# Patient Record
Sex: FEMALE | Race: WHITE | NOT HISPANIC OR LATINO
[De-identification: names, ages, dates, MRNs, and addresses within clinical notes are randomized per-mention and may not be internally consistent; named-entity substitution may affect disease eponyms.]

---

## 2022-04-26 ENCOUNTER — TRANSCRIPTION ENCOUNTER (OUTPATIENT)
Age: 27
End: 2022-04-26

## 2022-04-26 ENCOUNTER — RESULT REVIEW (OUTPATIENT)
Age: 27
End: 2022-04-26

## 2022-04-26 ENCOUNTER — INPATIENT (INPATIENT)
Facility: HOSPITAL | Age: 27
LOS: 0 days | Discharge: HOME | DRG: 395 | End: 2022-04-26
Attending: STUDENT IN AN ORGANIZED HEALTH CARE EDUCATION/TRAINING PROGRAM | Admitting: STUDENT IN AN ORGANIZED HEALTH CARE EDUCATION/TRAINING PROGRAM
Payer: COMMERCIAL

## 2022-04-26 VITALS
DIASTOLIC BLOOD PRESSURE: 86 MMHG | HEART RATE: 94 BPM | RESPIRATION RATE: 20 BRPM | TEMPERATURE: 98 F | SYSTOLIC BLOOD PRESSURE: 123 MMHG | OXYGEN SATURATION: 98 %

## 2022-04-26 VITALS
RESPIRATION RATE: 16 BRPM | TEMPERATURE: 98 F | DIASTOLIC BLOOD PRESSURE: 60 MMHG | OXYGEN SATURATION: 96 % | SYSTOLIC BLOOD PRESSURE: 100 MMHG | HEART RATE: 97 BPM

## 2022-04-26 DIAGNOSIS — F10.10 ALCOHOL ABUSE, UNCOMPLICATED: ICD-10-CM
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
## 2022-04-26 DIAGNOSIS — K20.0 EOSINOPHILIC ESOPHAGITIS: ICD-10-CM

## 2022-04-26 DIAGNOSIS — D72.829 ELEVATED WHITE BLOOD CELL COUNT, UNSPECIFIED: ICD-10-CM

## 2022-04-26 DIAGNOSIS — R63.8 OTHER SYMPTOMS AND SIGNS CONCERNING FOOD AND FLUID INTAKE: ICD-10-CM

## 2022-04-26 DIAGNOSIS — K22.2 ESOPHAGEAL OBSTRUCTION: ICD-10-CM

## 2022-04-26 DIAGNOSIS — R13.10 DYSPHAGIA, UNSPECIFIED: ICD-10-CM

## 2022-04-26 DIAGNOSIS — K20.90 ESOPHAGITIS, UNSPECIFIED WITHOUT BLEEDING: ICD-10-CM

## 2022-04-26 LAB
ALBUMIN SERPL ELPH-MCNC: 4 G/DL — SIGNIFICANT CHANGE UP (ref 3.4–5)
ALP SERPL-CCNC: 55 U/L — SIGNIFICANT CHANGE UP (ref 40–120)
ALT FLD-CCNC: 19 U/L — SIGNIFICANT CHANGE UP (ref 12–42)
ANION GAP SERPL CALC-SCNC: 8 MMOL/L — LOW (ref 9–16)
APPEARANCE UR: CLEAR — SIGNIFICANT CHANGE UP
APTT BLD: 30 SEC — SIGNIFICANT CHANGE UP (ref 27.5–35.5)
AST SERPL-CCNC: 15 U/L — SIGNIFICANT CHANGE UP (ref 15–37)
BASOPHILS # BLD AUTO: 0.09 K/UL — SIGNIFICANT CHANGE UP (ref 0–0.2)
BASOPHILS NFR BLD AUTO: 0.8 % — SIGNIFICANT CHANGE UP (ref 0–2)
BILIRUB SERPL-MCNC: 0.4 MG/DL — SIGNIFICANT CHANGE UP (ref 0.2–1.2)
BILIRUB UR-MCNC: NEGATIVE — SIGNIFICANT CHANGE UP
BUN SERPL-MCNC: 10 MG/DL — SIGNIFICANT CHANGE UP (ref 7–23)
CALCIUM SERPL-MCNC: 8.8 MG/DL — SIGNIFICANT CHANGE UP (ref 8.5–10.5)
CHLORIDE SERPL-SCNC: 108 MMOL/L — SIGNIFICANT CHANGE UP (ref 96–108)
CO2 SERPL-SCNC: 27 MMOL/L — SIGNIFICANT CHANGE UP (ref 22–31)
COLOR SPEC: YELLOW — SIGNIFICANT CHANGE UP
CREAT SERPL-MCNC: 0.64 MG/DL — SIGNIFICANT CHANGE UP (ref 0.5–1.3)
DIFF PNL FLD: NEGATIVE — SIGNIFICANT CHANGE UP
EGFR: 124 ML/MIN/1.73M2 — SIGNIFICANT CHANGE UP
EOSINOPHIL # BLD AUTO: 0.41 K/UL — SIGNIFICANT CHANGE UP (ref 0–0.5)
EOSINOPHIL NFR BLD AUTO: 3.5 % — SIGNIFICANT CHANGE UP (ref 0–6)
FLUAV AG NPH QL: SIGNIFICANT CHANGE UP
FLUBV AG NPH QL: SIGNIFICANT CHANGE UP
GLUCOSE SERPL-MCNC: 94 MG/DL — SIGNIFICANT CHANGE UP (ref 70–99)
GLUCOSE UR QL: NEGATIVE — SIGNIFICANT CHANGE UP
HCG SERPL-ACNC: <1 MIU/ML — SIGNIFICANT CHANGE UP
HCT VFR BLD CALC: 41.5 % — SIGNIFICANT CHANGE UP (ref 34.5–45)
HGB BLD-MCNC: 13.9 G/DL — SIGNIFICANT CHANGE UP (ref 11.5–15.5)
IMM GRANULOCYTES NFR BLD AUTO: 0.3 % — SIGNIFICANT CHANGE UP (ref 0–1.5)
INR BLD: 0.97 — SIGNIFICANT CHANGE UP (ref 0.88–1.16)
KETONES UR-MCNC: >=80 MG/DL
LEUKOCYTE ESTERASE UR-ACNC: NEGATIVE — SIGNIFICANT CHANGE UP
LIDOCAIN IGE QN: 56 U/L — LOW (ref 73–393)
LYMPHOCYTES # BLD AUTO: 2.76 K/UL — SIGNIFICANT CHANGE UP (ref 1–3.3)
LYMPHOCYTES # BLD AUTO: 23.6 % — SIGNIFICANT CHANGE UP (ref 13–44)
MCHC RBC-ENTMCNC: 31.5 PG — SIGNIFICANT CHANGE UP (ref 27–34)
MCHC RBC-ENTMCNC: 33.5 GM/DL — SIGNIFICANT CHANGE UP (ref 32–36)
MCV RBC AUTO: 94.1 FL — SIGNIFICANT CHANGE UP (ref 80–100)
MONOCYTES # BLD AUTO: 0.67 K/UL — SIGNIFICANT CHANGE UP (ref 0–0.9)
MONOCYTES NFR BLD AUTO: 5.7 % — SIGNIFICANT CHANGE UP (ref 2–14)
NEUTROPHILS # BLD AUTO: 7.75 K/UL — HIGH (ref 1.8–7.4)
NEUTROPHILS NFR BLD AUTO: 66.1 % — SIGNIFICANT CHANGE UP (ref 43–77)
NITRITE UR-MCNC: NEGATIVE — SIGNIFICANT CHANGE UP
NRBC # BLD: 0 /100 WBCS — SIGNIFICANT CHANGE UP (ref 0–0)
PH UR: 6 — SIGNIFICANT CHANGE UP (ref 5–8)
PLATELET # BLD AUTO: 257 K/UL — SIGNIFICANT CHANGE UP (ref 150–400)
POTASSIUM SERPL-MCNC: 4 MMOL/L — SIGNIFICANT CHANGE UP (ref 3.5–5.3)
POTASSIUM SERPL-SCNC: 4 MMOL/L — SIGNIFICANT CHANGE UP (ref 3.5–5.3)
PROT SERPL-MCNC: 7.1 G/DL — SIGNIFICANT CHANGE UP (ref 6.4–8.2)
PROT UR-MCNC: NEGATIVE MG/DL — SIGNIFICANT CHANGE UP
PROTHROM AB SERPL-ACNC: 11.4 SEC — SIGNIFICANT CHANGE UP (ref 10.5–13.4)
RBC # BLD: 4.41 M/UL — SIGNIFICANT CHANGE UP (ref 3.8–5.2)
RBC # FLD: 12 % — SIGNIFICANT CHANGE UP (ref 10.3–14.5)
RSV RNA NPH QL NAA+NON-PROBE: SIGNIFICANT CHANGE UP
SARS-COV-2 RNA SPEC QL NAA+PROBE: SIGNIFICANT CHANGE UP
SODIUM SERPL-SCNC: 143 MMOL/L — SIGNIFICANT CHANGE UP (ref 132–145)
SP GR SPEC: >=1.03 — SIGNIFICANT CHANGE UP (ref 1–1.03)
UROBILINOGEN FLD QL: 0.2 E.U./DL — SIGNIFICANT CHANGE UP
WBC # BLD: 11.71 K/UL — HIGH (ref 3.8–10.5)
WBC # FLD AUTO: 11.71 K/UL — HIGH (ref 3.8–10.5)

## 2022-04-26 PROCEDURE — 99285 EMERGENCY DEPT VISIT HI MDM: CPT

## 2022-04-26 PROCEDURE — 36415 COLL VENOUS BLD VENIPUNCTURE: CPT

## 2022-04-26 PROCEDURE — 80053 COMPREHEN METABOLIC PANEL: CPT

## 2022-04-26 PROCEDURE — 85025 COMPLETE CBC W/AUTO DIFF WBC: CPT

## 2022-04-26 PROCEDURE — 85610 PROTHROMBIN TIME: CPT

## 2022-04-26 PROCEDURE — 81003 URINALYSIS AUTO W/O SCOPE: CPT

## 2022-04-26 PROCEDURE — 71045 X-RAY EXAM CHEST 1 VIEW: CPT | Mod: 26

## 2022-04-26 PROCEDURE — 87637 SARSCOV2&INF A&B&RSV AMP PRB: CPT

## 2022-04-26 PROCEDURE — 96372 THER/PROPH/DIAG INJ SC/IM: CPT | Mod: XU

## 2022-04-26 PROCEDURE — 43247 EGD REMOVE FOREIGN BODY: CPT | Mod: GC

## 2022-04-26 PROCEDURE — 83690 ASSAY OF LIPASE: CPT

## 2022-04-26 PROCEDURE — 99222 1ST HOSP IP/OBS MODERATE 55: CPT | Mod: 25

## 2022-04-26 PROCEDURE — 99223 1ST HOSP IP/OBS HIGH 75: CPT | Mod: GC

## 2022-04-26 PROCEDURE — 87086 URINE CULTURE/COLONY COUNT: CPT

## 2022-04-26 PROCEDURE — 88305 TISSUE EXAM BY PATHOLOGIST: CPT | Mod: 26

## 2022-04-26 PROCEDURE — 84702 CHORIONIC GONADOTROPIN TEST: CPT

## 2022-04-26 PROCEDURE — 96374 THER/PROPH/DIAG INJ IV PUSH: CPT

## 2022-04-26 PROCEDURE — 88305 TISSUE EXAM BY PATHOLOGIST: CPT

## 2022-04-26 PROCEDURE — 96375 TX/PRO/DX INJ NEW DRUG ADDON: CPT

## 2022-04-26 PROCEDURE — 85730 THROMBOPLASTIN TIME PARTIAL: CPT

## 2022-04-26 PROCEDURE — 99285 EMERGENCY DEPT VISIT HI MDM: CPT | Mod: 25

## 2022-04-26 PROCEDURE — 71045 X-RAY EXAM CHEST 1 VIEW: CPT

## 2022-04-26 RX ORDER — SODIUM CHLORIDE 9 MG/ML
1000 INJECTION INTRAMUSCULAR; INTRAVENOUS; SUBCUTANEOUS ONCE
Refills: 0 | Status: COMPLETED | OUTPATIENT
Start: 2022-04-26 | End: 2022-04-26

## 2022-04-26 RX ORDER — OMEPRAZOLE 10 MG/1
1 CAPSULE, DELAYED RELEASE ORAL
Qty: 30 | Refills: 0
Start: 2022-04-26 | End: 2022-05-25

## 2022-04-26 RX ORDER — DILTIAZEM HCL 120 MG
10 CAPSULE, EXT RELEASE 24 HR ORAL ONCE
Refills: 0 | Status: COMPLETED | OUTPATIENT
Start: 2022-04-26 | End: 2022-04-26

## 2022-04-26 RX ORDER — ONDANSETRON 8 MG/1
4 TABLET, FILM COATED ORAL ONCE
Refills: 0 | Status: COMPLETED | OUTPATIENT
Start: 2022-04-26 | End: 2022-04-26

## 2022-04-26 RX ORDER — SODIUM CHLORIDE 9 MG/ML
500 INJECTION INTRAMUSCULAR; INTRAVENOUS; SUBCUTANEOUS ONCE
Refills: 0 | Status: COMPLETED | OUTPATIENT
Start: 2022-04-26 | End: 2022-04-26

## 2022-04-26 RX ORDER — FAMOTIDINE 10 MG/ML
20 INJECTION INTRAVENOUS ONCE
Refills: 0 | Status: COMPLETED | OUTPATIENT
Start: 2022-04-26 | End: 2022-04-26

## 2022-04-26 RX ORDER — LANOLIN ALCOHOL/MO/W.PET/CERES
3 CREAM (GRAM) TOPICAL AT BEDTIME
Refills: 0 | Status: DISCONTINUED | OUTPATIENT
Start: 2022-04-26 | End: 2022-04-26

## 2022-04-26 RX ORDER — PANTOPRAZOLE SODIUM 20 MG/1
40 TABLET, DELAYED RELEASE ORAL ONCE
Refills: 0 | Status: COMPLETED | OUTPATIENT
Start: 2022-04-26 | End: 2022-04-26

## 2022-04-26 RX ORDER — GLUCAGON INJECTION, SOLUTION 0.5 MG/.1ML
2 INJECTION, SOLUTION SUBCUTANEOUS ONCE
Refills: 0 | Status: COMPLETED | OUTPATIENT
Start: 2022-04-26 | End: 2022-04-26

## 2022-04-26 RX ORDER — KETOROLAC TROMETHAMINE 30 MG/ML
15 SYRINGE (ML) INJECTION ONCE
Refills: 0 | Status: DISCONTINUED | OUTPATIENT
Start: 2022-04-26 | End: 2022-04-26

## 2022-04-26 RX ORDER — ACETAMINOPHEN 500 MG
650 TABLET ORAL EVERY 6 HOURS
Refills: 0 | Status: DISCONTINUED | OUTPATIENT
Start: 2022-04-26 | End: 2022-04-26

## 2022-04-26 RX ORDER — FEXOFENADINE HCL 30 MG
0 TABLET ORAL
Qty: 0 | Refills: 0 | DISCHARGE

## 2022-04-26 RX ADMIN — FAMOTIDINE 20 MILLIGRAM(S): 10 INJECTION INTRAVENOUS at 03:43

## 2022-04-26 RX ADMIN — Medication 10 MILLIGRAM(S): at 04:51

## 2022-04-26 RX ADMIN — SODIUM CHLORIDE 1000 MILLILITER(S): 9 INJECTION INTRAMUSCULAR; INTRAVENOUS; SUBCUTANEOUS at 04:30

## 2022-04-26 RX ADMIN — Medication 0.5 MILLIGRAM(S): at 03:43

## 2022-04-26 RX ADMIN — ONDANSETRON 4 MILLIGRAM(S): 8 TABLET, FILM COATED ORAL at 04:30

## 2022-04-26 RX ADMIN — GLUCAGON INJECTION, SOLUTION 2 MILLIGRAM(S): 0.5 INJECTION, SOLUTION SUBCUTANEOUS at 02:34

## 2022-04-26 RX ADMIN — SODIUM CHLORIDE 500 MILLILITER(S): 9 INJECTION INTRAMUSCULAR; INTRAVENOUS; SUBCUTANEOUS at 14:35

## 2022-04-26 RX ADMIN — Medication 15 MILLIGRAM(S): at 09:36

## 2022-04-26 RX ADMIN — PANTOPRAZOLE SODIUM 40 MILLIGRAM(S): 20 TABLET, DELAYED RELEASE ORAL at 03:44

## 2022-04-26 NOTE — ED PROVIDER NOTE - NSFOLLOWUPINSTRUCTIONS_ED_ALL_ED_FT
- Continue all regular medications  - For pain, take tylenol or ibuprofen as directed on the packaging  - Follow up with your gastroenterologist as scheduled  - Return to the ER for any worsening symptoms or concerns

## 2022-04-26 NOTE — DISCHARGE NOTE PROVIDER - NSDCMRMEDTOKEN_GEN_ALL_CORE_FT
Allegra 30 mg oral tablet:   omeprazole 40 mg oral delayed release capsule: 1 cap(s) orally once a day

## 2022-04-26 NOTE — H&P ADULT - HISTORY OF PRESENT ILLNESS
Pt is a 26 yo F with no PMH who p/w globus sensation after swallowing pills the evening prior to admission. Was taking allegra and MV when she felt that they got stuck in her throat, and since hasn't tolerated PO. Has had a similar episode 1x in the past but did not last as long. Pending outpt GI eval but postponed appt. Due for EGD but has never had one yet. Denies N/V, abd pain, changes to appetite prior to episode.     On arrival, T 98.3, HR 94, /86, RR 20 O2sat 98% RA. EKG NSR. ER provider performed glidescope exam with base of vocal cords visualized without foreign body. Labs with WBC 11.71, CMP unremarkable, lipase neg, UA neg, RVP neg. CXR neg. Pt received toradol 15mg, diltiazem 10mg IV, famotidine 20mg IV, glucagon 2mg IM, ativan 0.5mg IV, zofran 4mg x2, protonix 40mg IV, and NS 1L. Admitted to [facility] for further observation and management. GI consulted and planning for possible EGD.  Pt is a 26 yo F with no PMH who p/w globus sensation after swallowing pills the evening prior to admission. Was taking allegra and MV when she felt that they got stuck in her throat, and since hasn't tolerated PO. Has had a similar episode in the past but these episodes were mostly related to solid food and resolved after a few minutes. Pending outpt GI eval but postponed appt. Due for EGD but has never had one yet. Pt continuing to complain of stuck feeling in throat and is actively spitting up secretions. Denies N/V, abd pain, changes to appetite prior to episode, no recent illnesses, fevers, chills, lightheadedness or dizziness. Home medications only include MV and allegra.     On arrival, T 98.3, HR 94, /86, RR 20 O2sat 98% RA. EKG NSR. ER provider performed glidescope exam with base of vocal cords visualized without foreign body. Labs with WBC 11.71, CMP unremarkable, lipase neg, UA neg, RVP neg. CXR neg. Pt received toradol 15mg, diltiazem 10mg IV, famotidine 20mg IV, glucagon 2mg IM, ativan 0.5mg IV, zofran 4mg x2, protonix 40mg IV, and NS 1L. Admitted to [facility] for further observation and management. GI consulted and planning for possible EGD.

## 2022-04-26 NOTE — ED PROVIDER NOTE - PHYSICAL EXAMINATION
Physical Exam:  Gen: NAD, AOx3, non-toxic appearing, able to ambulate without assistance  Head: NCAT  HEENT: EOMI, PEERLA, normal conjunctiva, tongue midline, oral mucosa moist, no foreign body in oropharynx, tolerating secretions  Lung: no respiratory distress, speaking in full sentences  Abd: soft, NT, ND  MSK: no visible deformities, ROM normal in UE/LE  Skin: Warm, well perfused, no rash  Psych: normal affect, calm Physical Exam:  Gen: NAD, AOx3, non-toxic appearing, able to ambulate without assistance  Head: NCAT  HEENT: EOMI, PEERLA, normal conjunctiva, tongue midline, oral mucosa moist, no foreign body in oropharynx, occasional spitting into bag  Lung: no respiratory distress, speaking in full sentences  Abd: soft, NT, ND  MSK: no visible deformities, ROM normal in UE/LE  Skin: Warm, well perfused, no rash  Psych: normal affect, calm

## 2022-04-26 NOTE — H&P ADULT - PROBLEM SELECTOR PLAN 4
F: S/p 1L NaCl in ED   E: Replete PRN   N: NPO   Dispo: RMF Pt endorsing 3-4 episodes of 3-4 drinks per week. In precontemplative state.   -Outpatient follow up alcohol counseling

## 2022-04-26 NOTE — H&P ADULT - NSHPPHYSICALEXAM_GEN_ALL_CORE
PHYSICAL EXAM:  GENERAL: NAD, resting comfortably in bed. Actively spitting up secretions.   HEAD:  Atraumatic, Normocephalic  EYES: EOMI, PERRLA, conjunctiva and sclera clear  ENMT: No tonsillar erythema, exudates, or enlargement; Moist mucous membranes  NECK: Supple, No JVD, Normal thyroid  HEART: Regular rate and rhythm; No murmurs, rubs, or gallops  RESPIRATORY: CTA B/L, No W/R/R, no respiratory distress and speaking in full sentences.   ABDOMEN: Soft, Nontender, Nondistended; Bowel sounds present  NEUROLOGY: A&Ox3, nonfocal, moving all extremities  EXTREMITIES:  2+ Peripheral Pulses, No clubbing, cyanosis, or edema  SKIN: warm, dry, normal color, no rash or abnormal lesions PHYSICAL EXAM:  GENERAL: NAD, resting comfortably in bed. Actively spitting up secretions.   HEAD:  Atraumatic, Normocephalic  EYES: EOMI, PERRLA, conjunctiva and sclera clear  ENMT: No tonsillar erythema, exudates, or enlargement; Moist mucous membranes. No lesions or thrush noted.  NECK: Supple, No JVD, Normal thyroid  HEART: Regular rate and rhythm; No murmurs, rubs, or gallops  RESPIRATORY: CTA B/L, No W/R/R, no respiratory distress and speaking in full sentences.   ABDOMEN: Soft, Nontender, Nondistended; Bowel sounds present  NEUROLOGY: A&Ox3, nonfocal, moving all extremities  EXTREMITIES:  2+ Peripheral Pulses, No clubbing, cyanosis, or edema  SKIN: warm, dry, normal color, no rash or abnormal lesions

## 2022-04-26 NOTE — H&P ADULT - ASSESSMENT
Pt is a 26 yo F with no PMH who p/w globus sensation after swallowing pills the evening prior to admission. GI consulted and planning for possible EGD. Admitted to [facility] for further observation and management.

## 2022-04-26 NOTE — H&P ADULT - PROBLEM SELECTOR PLAN 1
- pt p/w 1d globus sensation after Pt p/w 1d globus sensation following taking multiple pills last night. Pt has had these episodes in the past that only last for about 2-3 minutes, however this episode has not yet resolved. Pt received Diltiazem, Glucagon, famotidine, ativan, zofran in ED without relief. Pending outpatient GI evaluation. CXR without acute pathology or foreign body, glidescope in ED without foreign body.   -GI consult, plan for EGD this afternoon   -NPO pending EGD Pill impaction esophagitis following pill ingestion. Pt failed medical management with Diltiazem, Glucagon, famotidine, ativan, zofran in ED . Continuing to c/o "stuck" sensation in throat with active spitting up of secretions    -GI consult, plan for EGD this afternoon   -NPO pending EGD

## 2022-04-26 NOTE — CONSULT NOTE ADULT - ATTENDING COMMENTS
Pt seen and d/w fellow this afternoon.  Pt had an EGD demonstrating impacted pill debris that was dislodged and fell into the stomach.

## 2022-04-26 NOTE — ED PROVIDER NOTE - NS ED ATTENDING STATEMENT MOD
I have seen and examined this patient and fully participated in the care of this patient as the teaching attending.  The service was shared with the EDUARDO.  I reviewed and verified the documentation and independently performed the documented:

## 2022-04-26 NOTE — DISCHARGE NOTE PROVIDER - CARE PROVIDERS DIRECT ADDRESSES
[email or web address]

## 2022-04-26 NOTE — DISCHARGE NOTE PROVIDER - HOSPITAL COURSE
#Discharge: do not delete    28 yo F with no significant pmh other presenting for pill impaction with inability to tolerate secretions s/p successful pill disimpaction via EGD.    Problem List/Main Diagnoses (system-based):     #Pill Impaction- s/p EGD   EGD findings: Pill debris was seen in the esophagus, with obstruction of the distal esophagus. The debris was flushed and easily pushed in to the stomach with corbin pressure, resulting in successful disimpaction. Afterwards, examination of the underlying mucosa showed distal esophagitis caused by pill impaction. The proximal esophagus had linear furrows, suggestive of EoE. The GE junction was located at 40cm from the incisors. Biopsies were obtained at the distal esophagus and at 30cm from the incisors.  - Normal stomach; biopsies were obtained to evaluate for H.Pylori infection.   - Normal duodenum; biopsies were obtained to evaluate for celiac disease.   - D/c on omperazole 40mg qd  - F/u w/Dr. Lance out pt      New medications: omeprazole 40mg qd  Labs to be followed outpatient: H pylori bx  Exam to be followed outpatient: none   #Discharge: do not delete    28 yo F with no significant pmh other presenting for pill impaction with inability to tolerate secretions s/p successful pill disimpaction via EGD.    Problem List/Main Diagnoses (system-based):     #Pill Impaction- s/p EGD   EGD findings: Pill debris was seen in the esophagus, with obstruction of the distal esophagus. The debris was flushed and easily pushed in to the stomach with corbin pressure, resulting in successful disimpaction. Afterwards, examination of the underlying mucosa showed distal esophagitis caused by pill impaction. The proximal esophagus had linear furrows, suggestive of EoE. The GE junction was located at 40cm from the incisors. Biopsies were obtained at the distal esophagus and at 30cm from the incisors.  - Normal stomach; biopsies were obtained to evaluate for H.Pylori infection.   - Normal duodenum; biopsies were obtained to evaluate for celiac disease.   - D/c on omperazole 40mg qd  - F/u w/Dr. Lance out pt  - on discharge, patient tolerating PO, mils nausea after anesthesia      New medications: omeprazole 40mg qd  Labs to be followed outpatient: H pylori bx  Exam to be followed outpatient: none    Physical Exam at Time of Discharge  GENERAL: Awake, alert and interactive, no acute distress, appears comfortable  NEURO: A&Ox4, no focal deficits  HEENT: MMM  NECK: Supple  CARDIAC: Regular rate and rhythm, +S1/S2, no murmurs/rubs/gallops  PULM: Breathing comfortably on RA, clear to auscultation bilaterally, no wheezes/rales/rhonchi  ABDOMEN: Soft, nontender, nondistended  MSK: Range of motion grossly intact  Psych: Appropriate affect #Discharge: do not delete    28 yo F with no significant pmh other presenting for pill impaction with inability to tolerate secretions s/p successful pill disimpaction via EGD.    Problem List/Main Diagnoses (system-based):     #Pill Impaction- s/p EGD   EGD findings: Pill debris was seen in the esophagus, with obstruction of the distal esophagus. The debris was flushed and easily pushed in to the stomach with corbin pressure, resulting in successful disimpaction. Afterwards, examination of the underlying mucosa showed distal esophagitis caused by pill impaction. The proximal esophagus had linear furrows, suggestive of EoE. The GE junction was located at 40cm from the incisors. Biopsies were obtained at the distal esophagus and at 30cm from the incisors.  - Normal stomach; biopsies were obtained to evaluate for H.Pylori infection.   - Normal duodenum; biopsies were obtained to evaluate for celiac disease.   - D/c on omperazole 40mg qd  - F/u w/Dr. Lance out pt  - on discharge, patient tolerating PO, mild nausea after anesthesia      New medications: omeprazole 40mg qd  Labs to be followed outpatient: H pylori bx  Exam to be followed outpatient: none    Physical Exam at Time of Discharge  GENERAL: Awake, alert and interactive, no acute distress, appears comfortable  NEURO: A&Ox4, no focal deficits  HEENT: MMM  NECK: Supple  CARDIAC: Regular rate and rhythm, +S1/S2, no murmurs/rubs/gallops  PULM: Breathing comfortably on RA, clear to auscultation bilaterally, no wheezes/rales/rhonchi  ABDOMEN: Soft, nontender, nondistended  MSK: Range of motion grossly intact  Psych: Appropriate affect

## 2022-04-26 NOTE — DISCHARGE NOTE PROVIDER - NSDCCPCAREPLAN_GEN_ALL_CORE_FT
PRINCIPAL DISCHARGE DIAGNOSIS  Diagnosis: Food impaction of esophagus  Assessment and Plan of Treatment: You had a pill impaction that was successfully advanced using an endoscope, there were no complications to the procedure. Please take omeprazole 40mg once a day. Please followup with your gastroenterologist within 2-4 weeks.

## 2022-04-26 NOTE — ED ADULT NURSE NOTE - OBJECTIVE STATEMENT
Pt came to er c/o foreign body stuck in throat after taking vitamins. Pt unable to swallow liquids, coughing noted. Pt endorses hx of 1 pervious similar episode. Airway patent. Pt able to speak in full/clear sentences w/out difficulty. No drooling noted.

## 2022-04-26 NOTE — PATIENT PROFILE ADULT - FALL HARM RISK - UNIVERSAL INTERVENTIONS
Bed in lowest position, wheels locked, appropriate side rails in place/Call bell, personal items and telephone in reach/Instruct patient to call for assistance before getting out of bed or chair/Non-slip footwear when patient is out of bed/[place] to call system/Physically safe environment - no spills, clutter or unnecessary equipment/Purposeful Proactive Rounding/Room/bathroom lighting operational, light cord in reach

## 2022-04-26 NOTE — H&P ADULT - NSHPSOCIALHISTORY_GEN_ALL_CORE
Pt lives by herself in apartment. Fully functional in all ADLs at baseline. Denies smoking history. Drinks 3-4 drinks, 3-4 times per week. Pt lives by herself in apartment. Fully functional in all ADLs at baseline. Denies smoking history. Drinks 3-4 drinks, 3-4 times per week. Sexually active with one partner, HIV test within the past year negative.

## 2022-04-26 NOTE — ED PROVIDER NOTE - PROGRESS NOTE DETAILS
Alisa PGY3: patient continues to have symptoms, when tried to drink water "feels like a ping pong ball in the back of my throat". Will trial IV meds. Alisa PGY3: patient continues to have symptoms, when tried to drink water "feels like a ping pong ball in the back of my throat", not able to swallow. Will trial IV meds. Alisa PGY3: no improvement with meds, will get labs, still unable to swallow, spits back up when attempts. Alisa PGY3: unchanged, GI to scope at [facility], CXR clear

## 2022-04-26 NOTE — DISCHARGE NOTE NURSING/CASE MANAGEMENT/SOCIAL WORK - PATIENT PORTAL LINK FT
You can access the FollowMyHealth Patient Portal offered by [facility] by registering at the following website: [email or web address]. By joining [facility]’s FollowMyHealth portal, you will also be able to view your health information using other applications (apps) compatible with our system.

## 2022-04-26 NOTE — H&P ADULT - ATTENDING COMMENTS
27F with intermittent dysphagia to solids who presents after pill impaction in esophagus. Conservative therapy failed in ER and admitted for EGD for eval for possible pill impaction. Patient unable to tolerate secretions at this time spitting them out 2/2 pain.     Labs and imaging reviewed    Problem List:  #Pill impaction  #Dysphagia  #Leukocytosis  #EtOH Binge    Plan:  -GI on board appreciate recs, f/u regarding EGD  -Rest of plan pending results of scope  -Leukocytosis reactive  -Precontemplative for drinking    Dispo: Home when cleared

## 2022-04-26 NOTE — H&P ADULT - PROBLEM SELECTOR PLAN 3
On admission, WBC 11.71. Pt denying any infectious sx at this time. CXR without acute pathology, glidescope in ED without foreign body.  Likely reactive leukocytosis.   -Continue to trend WBC  -Monitor for signs and symptoms of infection

## 2022-04-26 NOTE — ED PROVIDER NOTE - CLINICAL SUMMARY MEDICAL DECISION MAKING FREE TEXT BOX
28yo female p/w sensation of pills being stuck in throat since 2 hours prior to arrival. No respiratory distress, tolerating secretions. Likely globus sensation from pills. Do not suspect pill esophagitis from multivitamin or allegra. Unlikely to cause esophageal impaction given size of pills and likely would have partially dissolved by oral enzymes. Offered patient PO, state "I'm not going to be able to eat or drink anything". Will give glucagon and zofran and PO challenge, likely dc. 28yo female p/w sensation of pills being stuck in throat since 2 hours prior to arrival. No respiratory distress, occasionally spitting into bag. Likely globus sensation from pills vs esophageal spasm. Do not suspect pill esophagitis from multivitamin or allegra. Unlikely to cause esophageal impaction given size of pills and likely would have partially dissolved by oral enzymes. Offered patient PO, state "I'm not going to be able to eat or drink anything". Will give glucagon and zofran and PO challenge, reassess.

## 2022-04-26 NOTE — ED ADULT NURSE REASSESSMENT NOTE - NS ED NURSE REASSESS COMMENT FT1
Pt resting requesting pain management. PA and MD aware. Pt  pending EMS transport to [facility]. Will continue to monitor.

## 2022-04-26 NOTE — CHART NOTE - NSCHARTNOTEFT_GEN_A_CORE
Pt is s/p EGD for pill impaction with myself and Dr. Lance    Findings:  - Pill debris was seen in the esophagus, with obstruction of the distal esophagus. The debris was flushed and easily pushed in to the stomach with corbin pressure, resulting in successful dissimpaction. Afterwards, examination of the underlying mucosa showed distal esophagitis caused by pill impaction. The proximal esophagus had linear furrows, suggestive of EoE. The GE junction was located at 40cm from the incisors. Biopsies were obtained at the distal esophagus and at 30cm from the incisors.  - Normal stomach; biopsies were obtained to evaluate for H.Pylori infection.   - Normal duodenum; biopsies were obtained to evaluate for celiac disease.     Plan:  - Advance diet as tolerated  - Daily PPI  - Avoid NSAIDS  - Follow up biopsy  - Follow up outpatient in GI clinic

## 2022-04-26 NOTE — DISCHARGE NOTE PROVIDER - CARE PROVIDER_API CALL
Progress/goals assessment (every 10 visits) by  PT              HEP:          Objective   findings: Right knee AA/flex = 110 degrees Right knee AA/flex = 110 degrees Right knee AA/flex = 110 degrees, ambulating w cane Right knee AA/flex = 110 degrees, ambulating w cane Right knee AA/flex = 110 degrees, ambulating w cane  Was able to complete more of her exercises today without increasing pain.    Manual treatments/provider interaction:  Verbal and manual cueing on proper performance of the prescribed exercise or of the designated task Verbal and manual cueing on proper performance of the prescribed exercise or of the designated task Verbal and manual cueing on proper performance of the prescribed exercise or of the designated task Verbal and manual cueing on proper performance of the prescribed exercise or of the designated task Verbal and manual cueing on proper performance of the prescribed exercise or of the designated task Verbal and manual cueing on proper performance of the prescribed exercise or of the designated task Verbal and manual cueing on proper performance of the prescribed exercise or of the designated task   Response to intervention:   Right knee remained 8/10 Muscle fatigue, right knee 8/10 Muscle fatigue, B knee 8/10 Muscle fatigue, B knee 8/10 Muscle fatigue Muscle fatigue Reports of muscle fatigue   Plan for Next Session: continue Cont 1x/week Cont 1x/week Cont 1x/week Cont 1x/week Cont 1x/week Cont 1x/week     Modality/intervention used:  [x] Therapeutic Exercise  [] Modalities:  [] Therapeutic Activity     [] Ultrasound  [] Elec  Stim  [] Gait Training      [] Cervical Traction [] Lumbar Traction  [] Neuromuscular Re-education    [] Sim Gang [] Iontophoresis   [] Instruction in HEP      [] Vasopneumatic     [] Manual Therapy               [x] Self care home management                    (    ) Dry needling    Post Tx Pain Ratin/10 B knee Wilfrid Lance J  GASTROENTEROLOGY  [address] [address]  Phone: [phone]  Fax: [phone]  Established Patient  Follow Up Time: 2 weeks

## 2022-04-26 NOTE — ED PROVIDER NOTE - OBJECTIVE STATEMENT
28yo female p/w difficulty swallowing since 12am after taking two Allegra and two multivitamin pills. Feels like pills are stuck, has had this before but never lasted this long. Has not been able to take PO since this happened. Denies abd pain, N/V. Had upcoming GI appt but postponed it. 26yo female p/w difficulty swallowing since 12am after taking two Allegra and two multivitamin pills. Feels like pills are stuck, has had this before but never lasted this long. Has not been able to take PO since this happened. Denies abd pain, N/V. Had upcoming GI appt but postponed it. was going to get an endoscopy for these symptoms but hadn't had them in awhile. Never had endoscopy before.

## 2022-04-26 NOTE — ED PROVIDER NOTE - ATTENDING CONTRIBUTION TO CARE
patient s/p swallowing 4 separate pills earlier in the evening, presents with pooling saliva, unable to bring down fluids, vomiting, and spitting up this evening. notes hx of similar, which resolve on their own, no hx of gi intervention for lodged bolus. patient given glucagon, ccb, benzo and antiemetic with some relief. cetacaine in the posterior oropharynx placed, and glidescope advance, visualized base of cords, no fb identified, cxr wnl with no fb. case discussed with gi and medicine. patient stable for admission and transfer and amenable to admission to [facility] for gi evaluation.

## 2022-04-26 NOTE — ED ADULT TRIAGE NOTE - CHIEF COMPLAINT QUOTE
Pt c/o foreign body stuck in throat after taking vitamins. Pt endorses hx of 1 pervious similar episode. Airway patent. Pt able to speak in full/clear sentences w/out difficulty. No drooling noted.

## 2022-04-26 NOTE — CONSULT NOTE ADULT - SUBJECTIVE AND OBJECTIVE BOX
GASTROENTEROLOGY CONSULT NOTE  HPI:  Pt is a 28 yo F with no PMH who p/w globus sensation after swallowing pills the evening prior to admission. Was taking allegra and MV when she felt that they got stuck in her throat, and since hasn't tolerated PO. Prior to taking pills she had pretzels for dinner and no issue with swallowing/ drinking. Has had a similar episodes in the past with sticking sensation but previously episodes self resolved minutes later. Never required ED visit or emergent scope before. Last episode was ~3 months ago. She saw a GI at [facility] who though she may have EoE and planned for EGD, but pt has not yet followed up. She has never had an EGD. Denies N/V, reflux, abd pain, changes to appetite prior to episode.     Tob: denies  ETOH: social, 3x/ week  NSAIDS: denies  FHx: Father with HTN and GERD    On arrival, T 98.3, HR 94, /86, RR 20 O2sat 98% RA. EKG NSR. ER provider performed glidescope exam with base of vocal cords visualized without foreign body. Labs with WBC 11.71, CMP unremarkable, lipase neg, UA neg, RVP neg. CXR neg. Pt received toradol 15mg, diltiazem 10mg IV, famotidine 20mg IV, glucagon 2mg IM, ativan 0.5mg IV, zofran 4mg x2, protonix 40mg IV, and NS 1L. Admitted to [facility] for further observation and management. GI consulted for EGD.      Allergies    amoxicillin (Unknown)    Intolerances      Home Medications:    MEDICATIONS:  MEDICATIONS  (STANDING):  LORazepam   Injectable 0.5 milliGRAM(s) IV Push once  melatonin 3 milliGRAM(s) Oral at bedtime    MEDICATIONS  (PRN):  acetaminophen     Tablet .. 650 milliGRAM(s) Oral every 6 hours PRN Temp greater or equal to 38C (100.4F), Mild Pain (1 - 3)    PAST MEDICAL & SURGICAL HISTORY:  No pertinent past medical history    No significant past surgical history      FAMILY HISTORY:    SOCIAL HISTORY:  Tobacco: [ ] Current, [ ] Former, [ ] Never; Pack Years:  Alcohol:  Illicit Drugs:    REVIEW OF SYSTEMS:  CONSTITUTIONAL: No weakness, fevers or chills  HEENT: No visual changes; No vertigo or throat pain   NECK: No pain or stiffness  RESPIRATORY: No cough, wheezing, hemoptysis; No shortness of breath  CARDIOVASCULAR: No chest pain or palpitations  GASTROINTESTINAL: As above.  GENITOURINARY: No dysuria, frequency or hematuria  NEUROLOGICAL: No numbness or weakness  SKIN: No itching, burning, rashes, or lesions   All other 10 review of systems is negative unless indicated above.    Vital Signs Last 24 Hrs  T(C): 36.7 (26 Apr 2022 13:11), Max: 36.9 (26 Apr 2022 07:18)  T(F): 98.1 (26 Apr 2022 13:11), Max: 98.5 (26 Apr 2022 07:18)  HR: 94 (26 Apr 2022 13:11) (77 - 101)  BP: 123/83 (26 Apr 2022 13:11) (110/72 - 148/98)  BP(mean): 96 (26 Apr 2022 13:11) (96 - 96)  RR: 18 (26 Apr 2022 13:11) (16 - 20)  SpO2: 96% (26 Apr 2022 13:11) (96% - 99%)      PHYSICAL EXAM:    General: in no acute distress  Eyes: Anicteric sclerae, moist conjunctivae  HENT: Moist mucous membranes  Neck: Trachea midline, supple  Lungs: Normal respiratory effort, no intercostal retractions  Cardiovascular: RRR  Abdomen: Soft, non-tender non-distended; No rebound or guarding  Extremities: Normal range of motion, No clubbing, cyanosis or edema  Neurological: Alert and oriented x3  Skin: Warm and dry. No obvious rash    LABS:                        13.9   11.71 )-----------( 257      ( 26 Apr 2022 04:45 )             41.5     04-26    143  |  108  |  10  ----------------------------<  94  4.0   |  27  |  0.64    Ca    8.8      26 Apr 2022 04:45    TPro  7.1  /  Alb  4.0  /  TBili  0.4  /  DBili  x   /  AST  15  /  ALT  19  /  AlkPhos  55  04-26        PT/INR - ( 26 Apr 2022 04:45 )   PT: 11.4 sec;   INR: 0.97          PTT - ( 26 Apr 2022 04:45 )  PTT:30.0 sec    RADIOLOGY & ADDITIONAL STUDIES:     Reviewed

## 2022-04-26 NOTE — DISCHARGE NOTE NURSING/CASE MANAGEMENT/SOCIAL WORK - NSDCPEFALRISK_GEN_ALL_CORE
For information on Fall & Injury Prevention, visit: [email or web address] OR  [email or web address] OR  https://www.cdc.gov/steadi/patient.html

## 2022-04-26 NOTE — H&P ADULT - NSHPLABSRESULTS_GEN_ALL_CORE
LABS:                        13.9   11.71 )-----------( 257      ( 26 Apr 2022 04:45 )             41.5     04-26    143  |  108  |  10  ----------------------------<  94  4.0   |  27  |  0.64    Ca    8.8      26 Apr 2022 04:45    TPro  7.1  /  Alb  4.0  /  TBili  0.4  /  DBili  x   /  AST  15  /  ALT  19  /  AlkPhos  55  04-26    PT/INR - ( 26 Apr 2022 04:45 )   PT: 11.4 sec;   INR: 0.97          PTT - ( 26 Apr 2022 04:45 )  PTT:30.0 sec  Urinalysis Basic - ( 26 Apr 2022 11:51 )    Color: Yellow / Appearance: Clear / SG: >=1.030 / pH: x  Gluc: x / Ketone: >=80 mg/dL  / Bili: NEGATIVE / Urobili: 0.2 E.U./dL   Blood: x / Protein: NEGATIVE mg/dL / Nitrite: NEGATIVE   Leuk Esterase: NEGATIVE / RBC: x / WBC x   Sq Epi: x / Non Sq Epi: x / Bacteria: x      CAPILLARY BLOOD GLUCOSE          RADIOLOGY & ADDITIONAL TESTS: Reviewed.

## 2022-04-26 NOTE — H&P ADULT - PROBLEM SELECTOR PLAN 2
Pt with dysphagia following swallowing of pills late last night. Now c/o "stuck" sensation in throat.   -Plan as above   -F/u GI recs Pt endorsing intermittent dysphagia to solids over the past few months which usually last 2-3 minutes, follows with outpatient GI doctor at [facility] and pending GI workup. Differential includes structural causes of dysphagia including stricture, esophageal web, ring.  -Plan as above, f/u EGD

## 2022-04-26 NOTE — CONSULT NOTE ADULT - ASSESSMENT
26 yo F with no significant pmh other presenting for pill impaction with inability to tolerate secretions.     #Foreign body impaction  - Pt took vitamins and allegra at midnight  - Unable to tolerate liquids or secretions since taking pills  - Never had EGD, but has had sticking sensation in esophagus in past, ~q3mo. Saw a GI in Dec at [facility]  - Will proceed with EGD for foreign body extraction  - Keep NPO    case discussed w c attending and primary team    Arlene Newberry MD  Gastroenterology Fellow, PGY -4   Pager [phone]  [phone]

## 2022-04-27 ENCOUNTER — NON-APPOINTMENT (OUTPATIENT)
Age: 27
End: 2022-04-27

## 2022-04-27 PROBLEM — Z78.9 OTHER SPECIFIED HEALTH STATUS: Chronic | Status: ACTIVE | Noted: 2022-04-26

## 2022-04-27 PROBLEM — Z00.00 ENCOUNTER FOR PREVENTIVE HEALTH EXAMINATION: Status: ACTIVE | Noted: 2022-04-27

## 2022-04-27 LAB — SURGICAL PATHOLOGY STUDY: SIGNIFICANT CHANGE UP

## 2022-04-28 LAB
CULTURE RESULTS: SIGNIFICANT CHANGE UP
SPECIMEN SOURCE: SIGNIFICANT CHANGE UP

## 2022-05-02 DIAGNOSIS — K22.10 ULCER OF ESOPHAGUS WITHOUT BLEEDING: ICD-10-CM
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
## 2022-05-02 DIAGNOSIS — T45.2X5A ADVERSE EFFECT OF VITAMINS, INITIAL ENCOUNTER: ICD-10-CM

## 2022-05-02 DIAGNOSIS — R13.10 DYSPHAGIA, UNSPECIFIED: ICD-10-CM

## 2022-05-02 DIAGNOSIS — T18.198A OTHER FOREIGN OBJECT IN ESOPHAGUS CAUSING OTHER INJURY, INITIAL ENCOUNTER: ICD-10-CM

## 2022-05-02 DIAGNOSIS — Z88.0 ALLERGY STATUS TO PENICILLIN: ICD-10-CM

## 2022-05-02 DIAGNOSIS — T17.298A OTHER FOREIGN OBJECT IN PHARYNX CAUSING OTHER INJURY, INITIAL ENCOUNTER: ICD-10-CM

## 2022-05-02 DIAGNOSIS — T45.0X5A ADVERSE EFFECT OF ANTIALLERGIC AND ANTIEMETIC DRUGS, INITIAL ENCOUNTER: ICD-10-CM

## 2022-05-02 DIAGNOSIS — R09.89 OTHER SPECIFIED SYMPTOMS AND SIGNS INVOLVING THE CIRCULATORY AND RESPIRATORY SYSTEMS: ICD-10-CM

## 2022-06-01 ENCOUNTER — APPOINTMENT (OUTPATIENT)
Dept: GASTROENTEROLOGY | Facility: CLINIC | Age: 27
End: 2022-06-01

## 2022-08-01 ENCOUNTER — TRANSCRIPTION ENCOUNTER (OUTPATIENT)
Age: 27
End: 2022-08-01

## 2022-08-17 NOTE — ED ADULT NURSE NOTE - NS ED NURSE DISCH DISPOSITION
Detail Level: Detailed Body Location Override (Optional - Billing Will Still Be Based On Selected Body Map Location If Applicable): left medial ankle Size Of Lesion: 1.5 X Size Of Lesion In Cm (Optional): 0 Anatomic Location From Referring Provider: August 31, 2022 at 8:45am Name Of The Referring Provider For Procedure: Mert Freeman MD Incorporate Mauc In Note: Yes Location Indication Override (Is Already Calculated Based On Selected Body Location): Area H Admitted

## 2022-11-12 NOTE — ED ADULT NURSE NOTE - HIV OFFER
Your presentation, findings on urinalysis are suggestive of UTI.  Explained likely early kidney infection.  Start taking antibiotic as prescribed, possible side effects were discussed.  May take over-the-counter probiotic as needed.  May take Tylenol codeine as prescribed to help with pain.  Possible side effects were discussed, what alcohol, follow precautions while driving.  Continue diet, liquids as tolerated.  You will be called with lab results and further advise as needed.  Follow with primary care physician or come back if not better in few days or report to ER sooner if worsening.   Opt out

## 2025-04-23 ENCOUNTER — APPOINTMENT (OUTPATIENT)
Dept: OBGYN | Facility: CLINIC | Age: 30
End: 2025-04-23

## (undated) DEVICE — BIOPSY FORCEP RADIAL JAW 4 STANDARD WITH NEEDLE